# Patient Record
Sex: MALE | Race: WHITE | NOT HISPANIC OR LATINO | RURAL
[De-identification: names, ages, dates, MRNs, and addresses within clinical notes are randomized per-mention and may not be internally consistent; named-entity substitution may affect disease eponyms.]

---

## 2023-08-09 DIAGNOSIS — L98.9 FACE LESION: Primary | ICD-10-CM

## 2023-10-04 DIAGNOSIS — R97.20 ELEVATED PSA: Primary | ICD-10-CM

## 2023-10-04 RX ORDER — SODIUM CHLORIDE, SODIUM LACTATE, POTASSIUM CHLORIDE, CALCIUM CHLORIDE 600; 310; 30; 20 MG/100ML; MG/100ML; MG/100ML; MG/100ML
INJECTION, SOLUTION INTRAVENOUS CONTINUOUS
Status: CANCELLED | OUTPATIENT
Start: 2023-10-05

## 2023-10-04 RX ORDER — DIPHENHYDRAMINE HYDROCHLORIDE 50 MG/ML
25 INJECTION INTRAMUSCULAR; INTRAVENOUS EVERY 6 HOURS PRN
Status: CANCELLED | OUTPATIENT
Start: 2023-10-05

## 2023-10-04 RX ORDER — ONDANSETRON 2 MG/ML
4 INJECTION INTRAMUSCULAR; INTRAVENOUS EVERY 4 HOURS PRN
Status: CANCELLED | OUTPATIENT
Start: 2023-10-05

## 2023-10-05 ENCOUNTER — HOSPITAL ENCOUNTER (OUTPATIENT)
Facility: HOSPITAL | Age: 68
Discharge: HOME | End: 2023-10-05
Attending: UROLOGY | Admitting: UROLOGY
Payer: MEDICARE

## 2023-10-05 ENCOUNTER — ANESTHESIA (OUTPATIENT)
Dept: SURGERY | Facility: HOSPITAL | Age: 68
End: 2023-10-05
Payer: MEDICARE

## 2023-10-05 ENCOUNTER — ANESTHESIA EVENT (OUTPATIENT)
Dept: SURGERY | Facility: HOSPITAL | Age: 68
End: 2023-10-05
Payer: MEDICARE

## 2023-10-05 VITALS
RESPIRATION RATE: 16 BRPM | SYSTOLIC BLOOD PRESSURE: 113 MMHG | OXYGEN SATURATION: 96 % | WEIGHT: 190 LBS | DIASTOLIC BLOOD PRESSURE: 77 MMHG | HEART RATE: 53 BPM | TEMPERATURE: 98 F

## 2023-10-05 DIAGNOSIS — R93.89 ABNORMAL MRI: ICD-10-CM

## 2023-10-05 DIAGNOSIS — R97.20 ELEVATED PSA: ICD-10-CM

## 2023-10-05 LAB — GLUCOSE SERPL-MCNC: 110 MG/DL (ref 70–105)

## 2023-10-05 PROCEDURE — 63600175 PHARM REV CODE 636 W HCPCS: Performed by: NURSE ANESTHETIST, CERTIFIED REGISTERED

## 2023-10-05 PROCEDURE — 71000033 HC RECOVERY, INTIAL HOUR: Performed by: UROLOGY

## 2023-10-05 PROCEDURE — 88344 SURGICAL PATHOLOGY: ICD-10-PCS | Mod: 26,ICN,, | Performed by: PATHOLOGY

## 2023-10-05 PROCEDURE — 88344 IMHCHEM/IMCYTCHM EA MLT ANTB: CPT | Mod: 26,ICN,, | Performed by: PATHOLOGY

## 2023-10-05 PROCEDURE — 27000655: Performed by: ANESTHESIOLOGY

## 2023-10-05 PROCEDURE — D9220A PRA ANESTHESIA: Mod: ANES,,, | Performed by: ANESTHESIOLOGY

## 2023-10-05 PROCEDURE — 27000177 HC AIRWAY, LARYNGEAL MASK: Performed by: ANESTHESIOLOGY

## 2023-10-05 PROCEDURE — 27000510 HC BLANKET BAIR HUGGER ANY SIZE: Performed by: ANESTHESIOLOGY

## 2023-10-05 PROCEDURE — 82962 GLUCOSE BLOOD TEST: CPT

## 2023-10-05 PROCEDURE — G0416 SURGICAL PATHOLOGY: ICD-10-PCS | Mod: 26,ICN,, | Performed by: PATHOLOGY

## 2023-10-05 PROCEDURE — D9220A PRA ANESTHESIA: ICD-10-PCS | Mod: CRNA,,, | Performed by: NURSE ANESTHETIST, CERTIFIED REGISTERED

## 2023-10-05 PROCEDURE — 36000705 HC OR TIME LEV I EA ADD 15 MIN: Performed by: UROLOGY

## 2023-10-05 PROCEDURE — D9220A PRA ANESTHESIA: ICD-10-PCS | Mod: ANES,,, | Performed by: ANESTHESIOLOGY

## 2023-10-05 PROCEDURE — 71000015 HC POSTOP RECOV 1ST HR: Performed by: UROLOGY

## 2023-10-05 PROCEDURE — 36000704 HC OR TIME LEV I 1ST 15 MIN: Performed by: UROLOGY

## 2023-10-05 PROCEDURE — 63600175 PHARM REV CODE 636 W HCPCS: Performed by: UROLOGY

## 2023-10-05 PROCEDURE — 27000716 HC OXISENSOR PROBE, ANY SIZE: Performed by: ANESTHESIOLOGY

## 2023-10-05 PROCEDURE — 88344 IMHCHEM/IMCYTCHM EA MLT ANTB: CPT | Mod: TC,SUR | Performed by: UROLOGY

## 2023-10-05 PROCEDURE — 37000009 HC ANESTHESIA EA ADD 15 MINS: Performed by: UROLOGY

## 2023-10-05 PROCEDURE — 37000008 HC ANESTHESIA 1ST 15 MINUTES: Performed by: UROLOGY

## 2023-10-05 PROCEDURE — 25000003 PHARM REV CODE 250: Performed by: NURSE ANESTHETIST, CERTIFIED REGISTERED

## 2023-10-05 PROCEDURE — D9220A PRA ANESTHESIA: Mod: CRNA,,, | Performed by: NURSE ANESTHETIST, CERTIFIED REGISTERED

## 2023-10-05 PROCEDURE — G0416 PROSTATE BIOPSY, ANY MTHD: HCPCS | Mod: 26,ICN,, | Performed by: PATHOLOGY

## 2023-10-05 RX ORDER — PROPOFOL 10 MG/ML
VIAL (ML) INTRAVENOUS
Status: DISCONTINUED | OUTPATIENT
Start: 2023-10-05 | End: 2023-10-05

## 2023-10-05 RX ORDER — MORPHINE SULFATE 10 MG/ML
4 INJECTION INTRAMUSCULAR; INTRAVENOUS; SUBCUTANEOUS EVERY 5 MIN PRN
Status: DISCONTINUED | OUTPATIENT
Start: 2023-10-05 | End: 2023-10-05 | Stop reason: HOSPADM

## 2023-10-05 RX ORDER — SODIUM CHLORIDE, SODIUM LACTATE, POTASSIUM CHLORIDE, CALCIUM CHLORIDE 600; 310; 30; 20 MG/100ML; MG/100ML; MG/100ML; MG/100ML
125 INJECTION, SOLUTION INTRAVENOUS CONTINUOUS
Status: DISCONTINUED | OUTPATIENT
Start: 2023-10-05 | End: 2023-10-05 | Stop reason: HOSPADM

## 2023-10-05 RX ORDER — LIDOCAINE HYDROCHLORIDE 20 MG/ML
INJECTION, SOLUTION EPIDURAL; INFILTRATION; INTRACAUDAL; PERINEURAL
Status: DISCONTINUED | OUTPATIENT
Start: 2023-10-05 | End: 2023-10-05

## 2023-10-05 RX ORDER — DIPHENHYDRAMINE HYDROCHLORIDE 50 MG/ML
25 INJECTION INTRAMUSCULAR; INTRAVENOUS EVERY 6 HOURS PRN
Status: DISCONTINUED | OUTPATIENT
Start: 2023-10-05 | End: 2023-10-05 | Stop reason: HOSPADM

## 2023-10-05 RX ORDER — GLYCOPYRROLATE 0.2 MG/ML
INJECTION INTRAMUSCULAR; INTRAVENOUS
Status: DISCONTINUED | OUTPATIENT
Start: 2023-10-05 | End: 2023-10-05

## 2023-10-05 RX ORDER — HYDROCODONE BITARTRATE AND ACETAMINOPHEN 5; 325 MG/1; MG/1
1 TABLET ORAL EVERY 6 HOURS PRN
Status: DISCONTINUED | OUTPATIENT
Start: 2023-10-05 | End: 2023-10-05 | Stop reason: HOSPADM

## 2023-10-05 RX ORDER — ONDANSETRON 2 MG/ML
4 INJECTION INTRAMUSCULAR; INTRAVENOUS DAILY PRN
Status: DISCONTINUED | OUTPATIENT
Start: 2023-10-05 | End: 2023-10-05 | Stop reason: HOSPADM

## 2023-10-05 RX ORDER — OXYCODONE HYDROCHLORIDE 5 MG/1
5 TABLET ORAL
Status: DISCONTINUED | OUTPATIENT
Start: 2023-10-05 | End: 2023-10-05 | Stop reason: HOSPADM

## 2023-10-05 RX ORDER — GENTAMICIN SULFATE 40 MG/ML
120 INJECTION, SOLUTION INTRAMUSCULAR; INTRAVENOUS
Status: DISCONTINUED | OUTPATIENT
Start: 2023-10-05 | End: 2023-10-05 | Stop reason: HOSPADM

## 2023-10-05 RX ORDER — ONDANSETRON 2 MG/ML
4 INJECTION INTRAMUSCULAR; INTRAVENOUS EVERY 4 HOURS PRN
Status: DISCONTINUED | OUTPATIENT
Start: 2023-10-05 | End: 2023-10-05 | Stop reason: HOSPADM

## 2023-10-05 RX ORDER — HYDROMORPHONE HYDROCHLORIDE 2 MG/ML
0.5 INJECTION, SOLUTION INTRAMUSCULAR; INTRAVENOUS; SUBCUTANEOUS EVERY 5 MIN PRN
Status: DISCONTINUED | OUTPATIENT
Start: 2023-10-05 | End: 2023-10-05 | Stop reason: HOSPADM

## 2023-10-05 RX ORDER — SODIUM CHLORIDE, SODIUM LACTATE, POTASSIUM CHLORIDE, CALCIUM CHLORIDE 600; 310; 30; 20 MG/100ML; MG/100ML; MG/100ML; MG/100ML
INJECTION, SOLUTION INTRAVENOUS CONTINUOUS
Status: DISCONTINUED | OUTPATIENT
Start: 2023-10-05 | End: 2023-10-05 | Stop reason: HOSPADM

## 2023-10-05 RX ORDER — FENTANYL CITRATE 50 UG/ML
INJECTION, SOLUTION INTRAMUSCULAR; INTRAVENOUS
Status: DISCONTINUED | OUTPATIENT
Start: 2023-10-05 | End: 2023-10-05

## 2023-10-05 RX ORDER — MIDAZOLAM HYDROCHLORIDE 1 MG/ML
INJECTION INTRAMUSCULAR; INTRAVENOUS
Status: DISCONTINUED | OUTPATIENT
Start: 2023-10-05 | End: 2023-10-05

## 2023-10-05 RX ORDER — MEPERIDINE HYDROCHLORIDE 25 MG/ML
25 INJECTION INTRAMUSCULAR; INTRAVENOUS; SUBCUTANEOUS EVERY 10 MIN PRN
Status: DISCONTINUED | OUTPATIENT
Start: 2023-10-05 | End: 2023-10-05 | Stop reason: HOSPADM

## 2023-10-05 RX ORDER — DEXAMETHASONE SODIUM PHOSPHATE 4 MG/ML
INJECTION, SOLUTION INTRA-ARTICULAR; INTRALESIONAL; INTRAMUSCULAR; INTRAVENOUS; SOFT TISSUE
Status: DISCONTINUED | OUTPATIENT
Start: 2023-10-05 | End: 2023-10-05

## 2023-10-05 RX ADMIN — ONDANSETRON 4 MG: 2 INJECTION INTRAMUSCULAR; INTRAVENOUS at 10:10

## 2023-10-05 RX ADMIN — FENTANYL CITRATE 50 MCG: 50 INJECTION INTRAMUSCULAR; INTRAVENOUS at 10:10

## 2023-10-05 RX ADMIN — MIDAZOLAM HYDROCHLORIDE 2 MG: 1 INJECTION, SOLUTION INTRAMUSCULAR; INTRAVENOUS at 10:10

## 2023-10-05 RX ADMIN — FENTANYL CITRATE 50 MCG: 50 INJECTION INTRAMUSCULAR; INTRAVENOUS at 11:10

## 2023-10-05 RX ADMIN — SODIUM CHLORIDE, POTASSIUM CHLORIDE, SODIUM LACTATE AND CALCIUM CHLORIDE: 600; 310; 30; 20 INJECTION, SOLUTION INTRAVENOUS at 10:10

## 2023-10-05 RX ADMIN — GLYCOPYRROLATE 0.2 MG: 0.2 INJECTION INTRAMUSCULAR; INTRAVENOUS at 11:10

## 2023-10-05 RX ADMIN — DEXAMETHASONE SODIUM PHOSPHATE 10 MG: 4 INJECTION, SOLUTION INTRA-ARTICULAR; INTRALESIONAL; INTRAMUSCULAR; INTRAVENOUS; SOFT TISSUE at 10:10

## 2023-10-05 RX ADMIN — LIDOCAINE HYDROCHLORIDE 100 MG: 20 INJECTION, SOLUTION INTRAVENOUS at 10:10

## 2023-10-05 RX ADMIN — PROPOFOL 120 MG: 10 INJECTION, EMULSION INTRAVENOUS at 10:10

## 2023-10-05 NOTE — ANESTHESIA POSTPROCEDURE EVALUATION
Anesthesia Post Evaluation    Patient: Guille Peres    Procedure(s) Performed: Procedure(s) (LRB):  BIOPSY, PROSTATE, USING PROSTATE MAPPING (N/A)    Final Anesthesia Type: general      Patient location during evaluation: PACU  Patient participation: Yes- Able to Participate  Level of consciousness: awake and sedated  Post-procedure vital signs: reviewed and stable  Pain management: adequate  Airway patency: patent    PONV status at discharge: No PONV  Anesthetic complications: no      Cardiovascular status: blood pressure returned to baseline  Respiratory status: unassisted  Hydration status: euvolemic  Follow-up not needed.          Vitals Value Taken Time   /77 10/05/23 1216   Temp 36.4 °C (97.5 °F) 10/05/23 1127   Pulse 50 10/05/23 1224   Resp 16 10/05/23 1200   SpO2 97 % 10/05/23 1224   Vitals shown include unvalidated device data.      Event Time   Out of Recovery 11:57:00         Pain/Tomasa Score: Tomasa Score: 10 (10/5/2023 12:07 PM)  Modified Tomasa Score: 20 (10/5/2023 12:43 PM)

## 2023-10-05 NOTE — OR NURSING
1123 Rec'd pt to PACU drowsy but arousable to verbal stimuli. No signs of distress noted, respirations even and unlabored. VSS. No bleeding noted from rectum/urethral opening. Denies pain/needs. Will continue to monitor.     1157 Out of PACU. VSS. No signs of bleeding/distress noted.     1200 Pt to ASC 17 awake and alert with no distress noted, respirations even and unlabored. Family at bedside. Bedside report given to SHANIQUA Maldonado RN. No bleeding noted from rectum/urethral opening. Denies pain/needs. /67, P 56, R 14, O2 96% RA.

## 2023-10-05 NOTE — OP NOTE
10/05/2023     Preoperative diagnosis:  BPH with elevated PSA and an abnormal MRI of the prostate.    Postop diagnosis: Same.    Procedure:  Transrectal ultrasound with MR fusion biopsy of the prostate.    Surgeon:  Kaiden Barton MD    EBL:  5 cc.    Complications:  None.    Brief clinical summary:  68-year-old white male with an elevated PSA.  MRI shows 2 lesions 1 is a PI-RADS 4 and another is a PI-RADS 3.  I have recommended MR fusion biopsy.  This was explained at length and in detail.  Risks, complications, outcomes, sequelae, prognosis and alternative therapy discussed.  Patient understood this and agreed to proceed.      Technique:  Patient is brought to the operative suite and placed on table in supine position.  He was given a general LMA anesthetic which he tolerated well and rolled and placed in the left lateral decubitus position.  All pressure points were padded.  The ultrasound probe is inserted in the rectum atraumatically.  The uro nebs system was then situated over patient.  The prostate is serially examined and mapped.  The lesions are identified.    The lesions are then biopsy time 3.  Sextant biopsies were then obtained.  The ultrasound probe was then removed.  Perineal pressure was held for 5 minutes.  Patient was then rolled to the supine position and awakened from general anesthesia having tolerated procedure well.  He was then sent to the recovery room in stable condition.

## 2023-10-05 NOTE — ANESTHESIA PREPROCEDURE EVALUATION
10/05/2023  Guille Peres is a 68 y.o., male.      Pre-op Assessment    I have reviewed the Patient Summary Reports.     I have reviewed the Nursing Notes. I have reviewed the NPO Status.   I have reviewed the Medications.     Review of Systems  Anesthesia Hx:  No problems with previous Anesthesia    Social:  Non-Smoker, No Alcohol Use    Hematology/Oncology:  Hematology Normal   Oncology Normal     EENT/Dental:EENT/Dental Normal   Cardiovascular:   Hypertension    Pulmonary:  Pulmonary Normal    Renal/:   BPH Elevated PSA   Hepatic/GI:  Hepatic/GI Normal    Musculoskeletal:  Musculoskeletal Normal    Neurological:  Neurology Normal    Endocrine:  Endocrine Normal    Dermatological:  Skin Normal    Psych:  Psychiatric Normal           Physical Exam  General: Well nourished    Airway:  Mallampati: II / II  Mouth Opening: Normal  TM Distance: > 6 cm  Tongue: Normal  Neck ROM: Normal ROM    Chest/Lungs:  Clear to auscultation, Normal Respiratory Rate    Heart:  Rate: Normal  Rhythm: Regular Rhythm        Anesthesia Plan  Type of Anesthesia, risks & benefits discussed:    Anesthesia Type: Gen Supraglottic Airway  Intra-op Monitoring Plan: Standard ASA Monitors  Post Op Pain Control Plan: multimodal analgesia  Induction:  IV  Informed Consent: Informed consent signed with the Patient and all parties understand the risks and agree with anesthesia plan.  All questions answered. Patient consented to blood products? Yes  ASA Score: 3  Day of Surgery Review of History & Physical: H&P Update referred to the surgeon/provider.I have interviewed and examined the patient. I have reviewed the patient's H&P dated: There are no significant changes. H&P completed by Anesthesiologist.    Ready For Surgery From Anesthesia Perspective.     .

## 2023-10-05 NOTE — H&P
Guille Peres  :1955  MRN # [id number]    UPDATE 10/5/2023:  68-year-old white male with elevated PSA.  His MRI of Prostate showed 2 lesions.  One near the apex in the midline is interpreted as a PI-RADS 4 and another lesion on the right side is interpreted as a PI-RADS 3.    There has been no change in his H&P since he was last seen.  I explained to the patient once again the risks, complications, outcome and sequela of the MRI fusion biopsies of the prostate. Patient understands and wishes to proceed this morning.    CHEST: Clear to auscultation  HEART:  Regular rate and rhythm  ABDOMEN: Soft, flat, nontender, no masses, no flank masses, no CVA tenderness.  EXTREMITIES:  No clubbing, cyanosis, or edema  NEUROLOGIC:  Grossly intact.    Assessment:  1. BPH with lower urinary tract symptoms - N40.1 (Primary)  2. Elevated PSA - R97.20  3. Abnormal MRI - R93.89    Plan:  MRI fusion biopsies of the prostate today  ----------------------------------------------------------------------------------  Guille Peres  :1955  MRN # [id number]  Date of service:  2023    Reason for appointment:  Talk MRI PI-RADS 3 and 4 lesions    History of present illness:   68-year-old white male with elevated PSA.  Returns with MRI.  He had a prostate done by transrectal ultrasound in 2020.  All biopsies were benign.  He now has a PSA up to almost 6.  MRI was obtained.  This reveals what I think is a 27 g prostate.  There is a typo there.  There are 2 lesions seen.  One near the apex in the midline is interpreted as a PI-RADS 4 and another lesion on the right side is interpreted as a PI-RADS 3.   I related our findings to the patient and his wife.  With this in mind, he needs a biopsy.  I explained that this is done as an outpatient in the hospital under sedation/anesthesia.  We used the ultrasound to map the prostate and infused with the MRI and do targeted directed biopsies.  I will also do sextant  biopsies.   Risks, complications, outcome, sequela and prognosis explained.  Risk and benefits gone over.  Patient understood this and agreed to proceed.   Medical history verified, allergies verified, surgical history verified, hospitalizations verified, family history verified, social history verified, and vital signs taken by Kartik Alvarez.    Examination:  General examination:  CHEST: Clear to auscultation  HEART:  Regular rate and rhythm  ABDOMEN: Soft, flat, nontender, no masses, no flank masses, no CVA tenderness.  RECTAL:  Good tone, no masses, prostate 30 - 35 g.  No nodules or asymmetry.  MALE GENITOURINARY:  Normal penis without lesion, meatus normal, testes descended bilaterally, no testicular mass, cord structures normal, no hernia.  EXTREMITIES:  No clubbing, cyanosis, or edema  NEUROLOGIC:  Grossly intact.    Assessment:  1. BPH with lower urinary tract symptoms - N40.1 (Primary)  2. Elevated PSA - R97.20   3. Essential (primary) hypertension - I10  4. Abnormal MRI - R93.89    Patient with an elevated PSA and abnormal MRI.  He has 2 lesions seen.  PI-RADS 4 and PI-RADS 3.  I discussed MRI fusion biopsy in went over this in detail.  Patient and his wife understand.  This is done as an outpatient under anesthesia.  I stressed postoperative care. For a couple of days needs to be minimal.  Risk of bleeding was discussed.  He understands.      Past medical history:  Other hyperlipidemia  Essential (primary) hypertension  Insomnia unspecified  Gastroesophageal reflux disease without esophagitis    Surgical history:  Eardrum reconstruction   BOYD in lumbar spine x 2   Rotator cuff tear repair, right shoulder, Dr. Mcmahan 2015  CTS (right) 2018   Inguinal surgery 2020  Skin cancer on shoulder and head (local)    Family history:  Mother:  , lupus, rheumatoid arthritis, diagnosed with heart disease  Father:  Alive 90 years, hyperlipidemia, diagnosed with diabetes  Sister:  Alive  Brother:    Son(s):  Alive  One brother brothers, 2 sisters, 2 sons  Denies family history of AAA  1 brother     Social history:  Tobacco use:  Tobacco use/smoking: Are you a never smoker?  Yes  Tobacco use other than smoking: Are you an other tobacco use? No    Allergies:  NKDA    Current medication:  1 touch ultra test strips miscellaneous as directed in vitro daily with DX E11.9  Vitamin B6 500 mg tablet 1 tablet orally once a day  Claritin 10 mg tablet 1 tablet orally once a day  Aspirin 81 mg tablet chewable, 1 tablet orally once a day  Lipitor 20 mg tablet 1 tablet orally once a day  Flomax 0.4 mg capsule 1 capsule orally twice a day  Lisinopril 10 mg tablet 1 tablet orally once a day  Klonopin 0.5 mg tablet 1 tablet orally HS  Azelastine HCl 0.1% Soulution 1 puff in each nostril nasally twice a day  Singulair 10 mg tablet 1 tablet orally once a day  Cyclobenzaprine hydrochloride 10 mg tablet 1 tablet at bedtime as needed orally once a day  Celecoxib 100 mg capsule, 1 capsule with food orally twice a day as needed  Prilosec 20 mg capsule delayed release, 1 capsule orally as needed  Bentyl 20 mg tablet 1 tablet orally 4 times a day, note: Prn  Medication list reviewed and reconciled with the patient    Treatment:  1. BPH with lower urinary tract symptoms   Start Sulfamethoxazole - Trimethoprim tablet 800-160 mg, 1 tablet the night before procedure, the night after procedure, then twice daily till gone, orally, twice a day, 5 days, # 7 refills 0      Lab:  IH urine dipstick               Cindy Jhaveri; Dr. Kaiden Barton reviewed with patient in clinic.      Clinical note:  Bactrim DS, # 7, 1 the night before the procedure and the night of the procedure then twice a day until finished.  Schedule MRI fusion biopsy.

## 2023-10-05 NOTE — TRANSFER OF CARE
Anesthesia Transfer of Care Note    Patient: Guille Peres    Procedure(s) Performed: Procedure(s) (LRB):  BIOPSY, PROSTATE, USING PROSTATE MAPPING (N/A)    Patient location: PACU    Anesthesia Type: general    Transport from OR: Transported from OR on 6-10 L/min O2 by face mask with adequate spontaneous ventilation    Post pain: adequate analgesia    Post assessment: no apparent anesthetic complications    Post vital signs: stable    Level of consciousness: awake and alert    Nausea/Vomiting: no nausea/vomiting    Complications: none    Transfer of care protocol was followed      Last vitals:   Visit Vitals  /65 (BP Location: Left arm, Patient Position: Lying)   Pulse 66   Temp 36.4 °C (97.5 °F) (Oral)   Resp 12   Wt 86.2 kg (190 lb)   SpO2 98%

## 2023-10-05 NOTE — DISCHARGE SUMMARY
[facility] - Periop Services  Discharge Note  Short Stay    Procedure(s) (LRB):  BIOPSY, PROSTATE, USING PROSTATE MAPPING (N/A)      OUTCOME: Patient tolerated treatment/procedure well without complication and is now ready for discharge.    DISPOSITION: Home or Self Care    FINAL DIAGNOSIS:  Elevated PSA    FOLLOWUP: In clinic    DISCHARGE INSTRUCTIONS:  No discharge procedures on file.     TIME SPENT ON DISCHARGE: 10   minutes

## 2023-10-10 LAB
DHEA SERPL-MCNC: NORMAL
ESTROGEN SERPL-MCNC: NORMAL PG/ML
INSULIN SERPL-ACNC: NORMAL U[IU]/ML
LAB AP GROSS DESCRIPTION: NORMAL
LAB AP LABORATORY NOTES: NORMAL
T3RU NFR SERPL: NORMAL %

## (undated) DEVICE — NDL MAXCORE BIOPSY 18G 25CM

## (undated) DEVICE — NEEDLE GUIDE ENDOCAVITY